# Patient Record
Sex: FEMALE | Race: WHITE | NOT HISPANIC OR LATINO | URBAN - METROPOLITAN AREA
[De-identification: names, ages, dates, MRNs, and addresses within clinical notes are randomized per-mention and may not be internally consistent; named-entity substitution may affect disease eponyms.]

---

## 2017-01-19 ENCOUNTER — COMMUNICATION - HEALTHEAST (OUTPATIENT)
Dept: ADMINISTRATIVE | Facility: CLINIC | Age: 22
End: 2017-01-19

## 2017-01-19 ENCOUNTER — HOSPITAL ENCOUNTER (OUTPATIENT)
Dept: ULTRASOUND IMAGING | Facility: CLINIC | Age: 22
Discharge: HOME | End: 2017-01-19
Attending: ADVANCED PRACTICE MIDWIFE

## 2017-01-19 DIAGNOSIS — N63.20 LEFT BREAST LUMP: ICD-10-CM

## 2017-01-23 ENCOUNTER — COMMUNICATION - HEALTHEAST (OUTPATIENT)
Dept: ADMINISTRATIVE | Facility: CLINIC | Age: 22
End: 2017-01-23

## 2017-01-23 ENCOUNTER — AMBULATORY - HEALTHEAST (OUTPATIENT)
Dept: MIDWIFE SERVICES | Facility: CLINIC | Age: 22
End: 2017-01-23

## 2017-01-23 DIAGNOSIS — Z79.3 DYSMENORRHEA TREATED WITH ORAL CONTRACEPTIVE: ICD-10-CM

## 2017-01-23 DIAGNOSIS — Z00.00 HEALTH CARE MAINTENANCE: ICD-10-CM

## 2017-01-23 DIAGNOSIS — N94.6 DYSMENORRHEA TREATED WITH ORAL CONTRACEPTIVE: ICD-10-CM

## 2017-01-25 ENCOUNTER — COMMUNICATION - HEALTHEAST (OUTPATIENT)
Dept: MIDWIFE SERVICES | Facility: CLINIC | Age: 22
End: 2017-01-25

## 2017-02-08 ENCOUNTER — COMMUNICATION - HEALTHEAST (OUTPATIENT)
Dept: MIDWIFE SERVICES | Facility: CLINIC | Age: 22
End: 2017-02-08

## 2017-04-20 ENCOUNTER — AMBULATORY - HEALTHEAST (OUTPATIENT)
Dept: MIDWIFE SERVICES | Facility: CLINIC | Age: 22
End: 2017-04-20

## 2017-07-17 ENCOUNTER — COMMUNICATION - HEALTHEAST (OUTPATIENT)
Dept: ADMINISTRATIVE | Facility: CLINIC | Age: 22
End: 2017-07-17

## 2017-07-17 DIAGNOSIS — Z00.00 HEALTH CARE MAINTENANCE: ICD-10-CM

## 2017-07-17 DIAGNOSIS — Z79.3 DYSMENORRHEA TREATED WITH ORAL CONTRACEPTIVE: ICD-10-CM

## 2017-07-17 DIAGNOSIS — N94.6 DYSMENORRHEA TREATED WITH ORAL CONTRACEPTIVE: ICD-10-CM

## 2018-02-27 ENCOUNTER — COMMUNICATION - HEALTHEAST (OUTPATIENT)
Dept: MIDWIFE SERVICES | Facility: CLINIC | Age: 23
End: 2018-02-27

## 2018-03-06 ENCOUNTER — COMMUNICATION - HEALTHEAST (OUTPATIENT)
Dept: SCHEDULING | Facility: CLINIC | Age: 23
End: 2018-03-06

## 2018-03-06 DIAGNOSIS — Z30.9 CONTRACEPTIVE MANAGEMENT: ICD-10-CM

## 2018-07-02 ENCOUNTER — COMMUNICATION - HEALTHEAST (OUTPATIENT)
Dept: ADMINISTRATIVE | Facility: CLINIC | Age: 23
End: 2018-07-02

## 2018-07-10 ENCOUNTER — OFFICE VISIT - HEALTHEAST (OUTPATIENT)
Dept: MIDWIFE SERVICES | Facility: CLINIC | Age: 23
End: 2018-07-10

## 2018-07-10 DIAGNOSIS — Z30.41 ENCOUNTER FOR SURVEILLANCE OF CONTRACEPTIVE PILLS: ICD-10-CM

## 2018-07-10 DIAGNOSIS — Z11.3 ROUTINE SCREENING FOR STI (SEXUALLY TRANSMITTED INFECTION): ICD-10-CM

## 2018-07-10 DIAGNOSIS — Z00.00 ANNUAL PHYSICAL EXAM: ICD-10-CM

## 2018-07-10 DIAGNOSIS — Z12.4 SCREENING FOR CERVICAL CANCER: ICD-10-CM

## 2018-07-10 LAB — HIV 1+2 AB+HIV1 P24 AG SERPL QL IA: NEGATIVE

## 2018-07-10 ASSESSMENT — MIFFLIN-ST. JEOR: SCORE: 1401.79

## 2018-07-11 LAB
BKR LAB AP ABNORMAL BLEEDING: NO
BKR LAB AP BIRTH CONTROL/HORMONES: NORMAL
BKR LAB AP CERVICAL APPEARANCE: NORMAL
BKR LAB AP GYN ADEQUACY: NORMAL
BKR LAB AP GYN INTERPRETATION: NORMAL
BKR LAB AP HPV REFLEX: NORMAL
BKR LAB AP LMP: NORMAL
BKR LAB AP PATIENT STATUS: NORMAL
BKR LAB AP PREVIOUS ABNORMAL: NO
BKR LAB AP PREVIOUS NORMAL: NORMAL
C TRACH DNA SPEC QL PROBE+SIG AMP: NEGATIVE
HBV SURFACE AG SERPL QL IA: NEGATIVE
HCV AB SERPL QL IA: NEGATIVE
HIGH RISK?: NO
N GONORRHOEA DNA SPEC QL NAA+PROBE: NEGATIVE
PATH REPORT.COMMENTS IMP SPEC: NORMAL
RESULT FLAG (HE HISTORICAL CONVERSION): NORMAL
T PALLIDUM AB SER QL: NEGATIVE

## 2019-01-26 ENCOUNTER — COMMUNICATION - HEALTHEAST (OUTPATIENT)
Dept: MIDWIFE SERVICES | Facility: CLINIC | Age: 24
End: 2019-01-26

## 2019-06-20 ENCOUNTER — COMMUNICATION - HEALTHEAST (OUTPATIENT)
Dept: ADMINISTRATIVE | Facility: CLINIC | Age: 24
End: 2019-06-20

## 2019-06-20 ENCOUNTER — COMMUNICATION - HEALTHEAST (OUTPATIENT)
Dept: MIDWIFE SERVICES | Facility: CLINIC | Age: 24
End: 2019-06-20

## 2019-06-20 DIAGNOSIS — Z30.41 ENCOUNTER FOR SURVEILLANCE OF CONTRACEPTIVE PILLS: ICD-10-CM

## 2020-07-09 ENCOUNTER — OFFICE VISIT - HEALTHEAST (OUTPATIENT)
Dept: MIDWIFE SERVICES | Facility: CLINIC | Age: 25
End: 2020-07-09

## 2020-07-09 ENCOUNTER — AMBULATORY - HEALTHEAST (OUTPATIENT)
Dept: LAB | Facility: CLINIC | Age: 25
End: 2020-07-09

## 2020-07-09 DIAGNOSIS — Z20.9 EXPOSURE TO POTENTIAL INFECTION: ICD-10-CM

## 2020-07-09 DIAGNOSIS — Z00.00 ROUTINE GENERAL MEDICAL EXAMINATION AT A HEALTH CARE FACILITY: ICD-10-CM

## 2020-07-09 DIAGNOSIS — Z11.3 ROUTINE SCREENING FOR STI (SEXUALLY TRANSMITTED INFECTION): ICD-10-CM

## 2020-07-09 LAB — COVID-19 ANTIBODY IGG: NEGATIVE

## 2020-07-09 ASSESSMENT — MIFFLIN-ST. JEOR: SCORE: 1375.71

## 2020-07-10 LAB
C TRACH DNA SPEC QL PROBE+SIG AMP: NEGATIVE
N GONORRHOEA DNA SPEC QL NAA+PROBE: NEGATIVE

## 2020-09-21 ENCOUNTER — AMBULATORY - HEALTHEAST (OUTPATIENT)
Dept: INTERNAL MEDICINE | Facility: CLINIC | Age: 25
End: 2020-09-21

## 2020-09-21 ENCOUNTER — VIRTUAL VISIT (OUTPATIENT)
Dept: FAMILY MEDICINE | Facility: OTHER | Age: 25
End: 2020-09-21
Payer: COMMERCIAL

## 2020-09-21 DIAGNOSIS — Z20.822 SUSPECTED 2019 NOVEL CORONAVIRUS INFECTION: ICD-10-CM

## 2020-09-21 PROCEDURE — 99421 OL DIG E/M SVC 5-10 MIN: CPT | Performed by: PHYSICIAN ASSISTANT

## 2020-09-21 NOTE — PROGRESS NOTES
"Date: 2020 09:06:13  Clinician: Michelle Faye  Clinician NPI: [id number]  Patient: Brittany Chadwick  Patient : 1995  Patient Address: [address] [place] [address]  Patient Phone: [phone]  Visit Protocol: URI  Patient Summary:  Brittany is a 24 year old ( : 1995 ) female who initiated a [facility] Visit for COVID-19 (Coronavirus) evaluation and screening. When asked the question \"Please sign me up to receive news, health information and promotions from [facility].\", Brittany responded \"Yes\".    Brittany states her symptoms started 1-2 days ago.   Her symptoms consist of chills, malaise, facial pain or pressure, a sore throat, tooth pain, ageusia, diarrhea, a cough, nasal congestion, a headache, anosmia, vomiting, rhinitis, nausea, and myalgia. She is experiencing mild difficulty breathing with activities but can speak normally in full sentences. Brittany also feels feverish but was unable to measure her temperature.   Symptom details     Nasal secretions: The color of her mucus is blood-tinged and clear.    Cough: Brittany coughs every 5-10 minutes and her cough is more bothersome at night. Phlegm does not come into her throat when she coughs. She does not believe her cough is caused by post-nasal drip.     Sore throat: Brittany reports having severe throat pain (7-9 on a 10 point pain scale), does not have exudate on her tonsils, and can swallow liquids. The lymph nodes in her neck are not enlarged. A rash has not appeared on the skin since the sore throat started.     Facial pain or pressure: The facial pain or pressure does not feel worse when bending or leaning forward.     Headache: She states the headache is moderate (4-6 on a 10 point pain scale).     Tooth pain: The tooth pain is not caused by a cavity, recent dental work, or other mouth problems.      Brittany denies having ear pain, wheezing, and enlarged lymph nodes. She also denies having a sinus infection within the past year, " taking antibiotic medication in the past month, and having recent facial or sinus surgery in the past 60 days.   Precipitating events  Within the past week, Brittany has not been exposed to someone with strep throat. She has not recently been exposed to someone with influenza. Brittany has been in close contact with the following high risk individuals: children under the age of 5.   Pertinent COVID-19 (Coronavirus) information  In the past 14 days, Brittany has not worked in a congregate living setting.   She does not work or volunteer as healthcare worker or a  and does not work or volunteer in a healthcare facility.   Brittany also has not lived in a congregate living setting in the past 14 days. She does not live with a healthcare worker.   Brittany has not had a close contact with a laboratory-confirmed COVID-19 patient within 14 days of symptom onset.   Since December 2019, Brittany and has not had upper respiratory infection or influenza-like illness. Has not been diagnosed with lab-confirmed COVID-19 test   Pertinent medical history  Brittany does not get yeast infections when she takes antibiotics.   Brittany needs a return to work/school note.   Weight: 135 lbs   Brittany does not smoke or use smokeless tobacco.   She denies pregnancy and denies breastfeeding. She has menstruated in the past month.   Weight: 135 lbs    MEDICATIONS: No current medications, ALLERGIES: NKDA  Clinician Response:  Dear Brittany,   Your symptoms show that you may have coronavirus (COVID-19). This illness can cause fever, cough and trouble breathing. Many people get a mild case and get better on their own. Some people can get very sick.  What should I do?  We would like to test you for this virus.   1. Please call [phone] to schedule your visit. Explain that [facility] were [facility] to have a COVID-19 test. Be ready to share your [facility] visit ID number.  The following will serve as your written order for this COVID Test,  "ordered by me, for the indication of suspected COVID [Z20.828]: The test will be ordered in [employer], our electronic health record, after you are scheduled. It will show as ordered and authorized by Jospeh Mejia MD.  Order: COVID-19 (Coronavirus) PCR for SYMPTOMATIC testing from [facility].      2. When it's time for your COVID test:  Stay at least 6 feet away from others. (If someone will drive you to your test, stay in the backseat, as far away from the  as you can.)   Cover your mouth and nose with a mask, tissue or washcloth.  Go straight to the testing site. Don't make any stops on the way there or back.      3.Starting now: Stay home and away from others (self-isolate) until:   You've had no fever---and no medicine that reduces fever---for one full day (24 hours). And...   Your other symptoms have gotten better. For example, your cough or breathing has improved. And...   At least 10 days have passed since your symptoms started.       During this time, don't leave the house except for testing or medical care.   Stay in your own room, even for meals. Use your own bathroom if you can.   Stay away from others in your home. No hugging, kissing or shaking hands. No visitors.  Don't go to work, school or anywhere else.    Clean \"high touch\" surfaces often (doorknobs, counters, handles, etc.). Use a household cleaning spray or wipes. You'll find a full list of  on the EPA website: www.epa.gov/pesticide-registration/list-n-disinfectants-use-against-sars-cov-2.   Cover your mouth and nose with a mask, tissue or washcloth to avoid spreading germs.  Wash your hands and face often. Use soap and water.  Caregivers in these groups are at risk for severe illness due to COVID-19:  o People 65 years and older  o People who live in a nursing home or long-term care facility  o People with chronic disease (lung, heart, cancer, diabetes, kidney, liver, immunologic)  o People who have a weakened immune system, including those " who:   Are in cancer treatment  Take medicine that weakens the immune system, such as corticosteroids  Had a bone marrow or organ transplant  Have an immune deficiency  Have poorly controlled HIV or AIDS  Are obese (body mass index of 40 or higher)  Smoke regularly   o Caregivers should wear gloves while washing dishes, handling laundry and cleaning bedrooms and bathrooms.  o Use caution when washing and drying laundry: Don't shake dirty laundry, and use the warmest water setting that you can.  o For more tips, go to www.cdc.gov/coronavirus/2019-ncov/downloads/10Things.pdf.    4.Sign up for [employer]. We know it's scary to hear that you might have COVID-19. We want to track your symptoms to make sure you're okay over the next 2 weeks. Please look for an email from [employer]---this is a free, online program that we'll use to keep in touch. To sign up, follow the link in the email. Learn more at [email or web address]  How can I take care of myself?   Get lots of rest. Drink extra fluids (unless a doctor has told you not to).   Take Tylenol (acetaminophen) for fever or pain. If you have liver or kidney problems, ask your family doctor if it's okay to take Tylenol.   Adults can take either:    650 mg (two 325 mg pills) every 4 to 6 hours, or...   1,000 mg (two 500 mg pills) every 8 hours as needed.    Note: Don't take more than 3,000 mg in one day. Acetaminophen is found in many medicines (both prescribed and over-the-counter medicines). Read all labels to be sure you don't take too much.   For children, check the Tylenol bottle for the right dose. The dose is based on the child's age or weight.    If you have other health problems (like cancer, heart failure, an organ transplant or severe kidney disease): Call your specialty clinic if you don't feel better in the next 2 days.       Know when to call 911. Emergency warning signs include:    Trouble breathing or shortness of breath Pain or pressure in  the chest that doesn't go away Feeling confused like you haven't felt before, or not being able to wake up Bluish-colored lips or face.  Where can I get more information?   [facility] -- About COVID-19: [email or web address]   CDC -- What to Do If You're Sick: www.cdc.gov/coronavirus/2019-ncov/about/steps-when-sick.html   CDC -- Ending Home Isolation: www.cdc.gov/coronavirus/2019-ncov/hcp/disposition-in-home-patients.html   [facility] -- Caring for Someone: www.cdc.gov/coronavirus/2019-ncov/if-you-are-sick/care-for-someone.html   [facility] -- Interim Guidance for Hospital Discharge to Home: www.health.[facility].mn./diseases/coronavirus/hcp/hospdischarge.pdf   [facility] clinical trials (COVID-19 research studies): clinicalaffairs.[facility]-clinical-trials    Below are the COVID-19 hotlines at the [facility] ([facility]). Interpreters are available.    For health questions: Call 991-105-8176 or [phone] (7 a.m. to 7 p.m.) For questions about schools and childcare: Call [phone] or [phone] (7 a.m. to 7 p.m.)    Diagnosis: Cough  Diagnosis ICD: R05

## 2021-05-29 NOTE — TELEPHONE ENCOUNTER
Telephone call to patient.  No answer, and voicemail box has not yet been set up.  Unable to leave a voicemail.  [employer] message sent to patient to inform her that her refill has been sent to the [facility] in [place].

## 2021-05-29 NOTE — TELEPHONE ENCOUNTER
Name of caller: Patient  Phone number where you may be reached: [phone]  Reason for call: pt is calling for a refill of the Apri - she thought she would get a years worth but did not. [facility] in [place] is her preferred pharmacy.  Best time to call back: any  If we don't reach you, is it okay to leave a detailed message? no

## 2021-06-01 VITALS — WEIGHT: 147 LBS | HEIGHT: 64 IN | BODY MASS INDEX: 25.1 KG/M2

## 2021-06-04 VITALS
BODY MASS INDEX: 23.24 KG/M2 | WEIGHT: 139.5 LBS | HEART RATE: 68 BPM | SYSTOLIC BLOOD PRESSURE: 100 MMHG | DIASTOLIC BLOOD PRESSURE: 66 MMHG | HEIGHT: 65 IN

## 2021-06-09 NOTE — PROGRESS NOTES
Assessment:      Healthy female exam.   Consulting for contraception management  IUD consult     Plan:      1. Discussed nutrition and exercise, life/work balance, nutrition and hydration.  Advised MVI  2. Blood tests: not indicated today  3. STI screening accepts  4. Breast self exam technique reviewed and recommendation to know breasts well and when to report concerns to CNM.  Mammogram at tgd28-22.  5. Contraception: condoms, considering IUD  6.  Next pap due 7/2021. Pap only unless ASCUS  7.  RTC 1 year for annual physical exam, PRN  8.  COVID antibody test    Subjective:      Brittany Chadwick is a 24 y.o. female who presents for an annual exam.  Stopped taking Apri.  Felt that she was bloated and had gained weight especially in her breasts.  Stopped taking it about a year ago and instead has been using condoms.  interested in changing to contraception with fewer hormonal side effects.  We reviewed the different types of IUDs and the risks and benefits including, but not limited to: Bleeding pattern changes, perforation, expulsion, infection, side effects, timing for placement, medications for softening the cervix and anxiety prior to placement.  Also recommended that she take ibuprofen prior to placement if she chooses to have 1 in.  She is currently not working, as she was working at a  COVID.  She graduated in December and is now looking for a job.  She is questioning whether we can do COVID antibody testing for her today.  States that she was very sick in December without the symptoms and did not have strep or influenza at the time.  Discussed that it would be very unlikely that she had COVID-19 at that time, but we do have testing available.  Also discussed limitations of not knowing how long IgG antibodies remained in the system with COVID-19, as we are learning more more about this disease each day.  She does have one male partner and that relationship is going well.  Diet: Eats pretty healthy per  her report.  Her mom and her joined weight watchers in March and that is going well.  Exercise: Works out daily by running, hiking, rollerblading, doing videos, walking    Healthy Habits:   Regular Exercise: Yes   Sunscreen Use: Yes  Healthy Diet: Yes  Dental Visits Regularly: Yes  Seat Belt: Yes  Sexually active: Yes  STI risk No  domestic violence No    Self Breast Exam Monthly:No  Colonoscopy: N/A  Lipid Profile: N/A  Glucose Screen: N/A  Prevention of Osteoporosis: N/A  Last Dexa: N/A      Immunization History   Administered Date(s) Administered     DTaP / HiB 1998     Dtap 1997, 1997, 1997, 2002     HPV Quadrivalent 2009, 2009, 10/26/2011, 2015     Hep B, Peds or Adolescent 1997, 1997     Hib (PRP-T) 1997, 1997, 1997     Influenza, seasonal,quad inj 6-35 mos 10/25/2012     Influenza,live, Nasal Laiv4 2014     Influenza,seasonal quad, PF, =/> 6months 2017     Influenza,seasonal, Inj IIV3 2007     MMR 1998, 2002     Meningococcal MCV4P 10/26/2011, 2015     POLIO, Unspecified 1997, 1997, 1998, 2001     Tdap 2009, 2015     Varicella 1998, 2009     Immunization status: up to date and documented.    Gynecologic History  Patient's last menstrual period was 2020.  Contraception: condoms    Cancer screening:   Last Pap: 2018. Results were: normal  Last mammogram: n/a.       OB History    Para Term  AB Living   0 0 0 0 0 0   SAB TAB Ectopic Multiple Live Births   0 0 0 0 0       Current Outpatient Medications   Medication Sig Dispense Refill     b complex vitamins tablet Take 1 tablet by mouth daily.       desogestrel-ethinyl estradiol (APRI) 0.15-0.03 mg per tablet Take 1 tablet by mouth daily. 84 tablet 1     No current facility-administered medications for this visit.      Past Medical History:   Diagnosis Date     Dysmenorrhea      managed with OCP use     Past Surgical History:   Procedure Laterality Date     NO PAST SURGERIES       Patient has no known allergies.  Family History   Problem Relation Age of Onset     Bipolar disorder Paternal Grandmother      Mental illness Paternal Grandmother      No Medical Problems Mother      Diverticulitis Father      No Medical Problems Sister      No Medical Problems Maternal Uncle      No Medical Problems Paternal Aunt      No Medical Problems Paternal Uncle      No Medical Problems Maternal Grandmother      No Medical Problems Maternal Grandfather      No Medical Problems Maternal Uncle      No Medical Problems Paternal Uncle      No Medical Problems Paternal Uncle      No Medical Problems Paternal Uncle      No Medical Problems Paternal Uncle      Social History     Socioeconomic History     Marital status: Single     Spouse name: N/A     Number of children: 0     Years of education: high school     Highest education level: Not on file   Occupational History     Occupation: student     Comment:  [employer] fall 2015.  Plans to major in art education   Social Needs     Financial resource strain: Not on file     Food insecurity     Worry: Not on file     Inability: Not on file     Transportation needs     Medical: Not on file     Non-medical: Not on file   Tobacco Use     Smoking status: Never Smoker     Smokeless tobacco: Never Used   Substance and Sexual Activity     Alcohol use: Yes     Comment: one per week     Drug use: No     Sexual activity: Yes     Partners: Male     Birth control/protection: Condom, OCP   Lifestyle     Physical activity     Days per week: Not on file     Minutes per session: Not on file     Stress: Not on file   Relationships     Social connections     Talks on phone: Not on file     Gets together: Not on file     Attends Temple service: Not on file     Active member of club or organization: Not on file     Attends meetings of clubs or organizations: Not on file      "Relationship status: Not on file     Intimate partner violence     Fear of current or ex partner: Not on file     Emotionally abused: Not on file     Physically abused: Not on file     Forced sexual activity: Not on file   Other Topics Concern     Not on file   Social History Narrative    Lives with parents now.  In fall 2015 will live at [facility] with a roommate, returning summers to live at home with parents.       Review of Systems  General:  Wt gain prior to stopping Apri  Eyes: Denies problem  Ears/Nose/Throat: Denies problem  Cardiovascular: Denies problem  Respiratory:  Denies problem  Gastrointestinal:  bloating prior to stopping Apri  Genitourinary: denies problems  Musculoskeletal:  Denies problem  Skin: Denies problem  Neurologic:denies problems  Psychiatric: denies problems  Endocrine: denies problems        Objective:         Vitals:    07/09/20 0851   BP: 100/66   Pulse: 68   Weight: 139 lb 8 oz (63.3 kg)   Height: 5' 4.5\" (1.638 m)       Physical Exam:  General Appearance: Alert, cooperative, no distress, appears stated age  Skin: Skin color, texture, turgor normal, no rashes or lesions  Throat: Lips, mucosa, and tongue normal; teeth and gums normal  HEENT: grossly normal; otoscopic and opthalmic exam not performed.   Neck: Supple, symmetrical, trachea midline, no adenopathy;  thyroid: not enlarged, symmetric, no tenderness/mass/nodules  Lungs: Clear to auscultation bilaterally, respirations unlabored  Breasts: No breast masses, tenderness, asymmetry, or nipple discharge.  Heart: Regular rate and rhythm, S1 and S2 normal, no murmur, rub, or gallop  Abdomen: Soft, non-tender. No organomegaly or masses  Pelvic: Declines exam today      "

## 2021-06-09 NOTE — PATIENT INSTRUCTIONS - HE
Please observe for the following warning signs of rare complications to taking a combined-hormonal contraceptive pill; the warning signs spell out the word ACHES. If you have any of these symptoms, it may or may not be related to pill use. Usually these warning signs have an explanation other than pills, but you need to get checked to be sure. Do not ignore these problems or wait to see if they disappear on their own.     Abdominal Pain    Blood clot in the pelvis or liver    Benign liver tumor or gallbladder disease    Pregnancy in the fallopian tubes    Chest Pain    Blood clot in the lungs    Heart attack    Heart pain    Breast lump    Headaches    Stroke    Migraine headache, blurred vision, spots, zigzag lines, weakness, difficulty speaking    High blood pressure    Eye Problems    Stroke    Blurred vision, double vision, or loss of vision    Migraine headache with blurred vision, spots, zigzag lines    Blood clots in the eyes    Change in shape of cornea (contacts don't fit)    Severe Leg Pain    Inflammation and blood clots of a vein in the leg

## 2021-06-10 NOTE — PROGRESS NOTES
Received request for refill of OCPs (Apri).   Noted that pt's original RX was for one year of Apri OCPs given at time of annual exam 12/21/16, sent to pharmacy in [place], but in February, a request was received for one refill at a pharmacy in [place], [place], as pt had returned to school and desired refill at that pharmacy.  This most recent refill request is from that pharmacy in [place].  Attempted to call pt to inquire about where she plans to fill her prescriptions in the future, but no answer and unable to leave message.  Filled request at [facility] in [place] to allow pt to complete school year there.  In future will need to clarify where she would like to receive prescriptions.

## 2021-06-19 NOTE — PROGRESS NOTES
Assessment:      22 y.o., Healthy female exam  Contraceptive management  Routine Screening for cervical cancer  Routine Screening for STIs  Vegetarian, eats eggs and fish.     Plan:      1. Discussed nutrition and exercise. Advised MVI, Vitamin D3 2000-4000IU geltab and an omega 3 supplement daily.  2. Lab tests: accepts screening for STIs, and first PAP today.   3. Recommended annual breast exam by provider.  4. Contraception: Apri. Refilled x 1 year. Reviewed how to take and use of back up method x 1 week before it is effective. Plans to start Sunday after next menses rather than quick start method. Condoms always for STI prevention.  5.  Next pap due in 3 years if normal today.  6.  RTC 1 year for annual physical exam, PRN    Subjective:      Brittany Chadwick is a 22 y.o. female who presents for an annual exam and refill of OCPs. Has been on COCs (Apri) since 8th grade. She started these due to dysmenorrhea. Reports satisfaction with this method and would like to continue. Denies hx of migraine with aura, blood clots, smoking. Reports her withdrawal bleeding came 1 week early with last 2 cycles, however she wasn't taking the pills as regularly as far as timing and then ran out last month. She normally is good at taking the pill regularly and has never been pregnant. Has been using condoms reliably since off of BC.    Reports being mostly vegetarian, however eats fish and eggs. Avoids other meat and dairy products. Takes Vitamin B12, unsure of dose.    Healthy Habits:   Regular Exercise: Yes   Sunscreen Use: Yes  Healthy Diet: Yes  Dental Visits Regularly: Yes  Seat Belt: Yes  Sexually active: Yes  STI risk in a monagamous relatioship x 1 year, however at risk due to age  Domestic violence No    Gynecologic History  Patient's last menstrual period was 06/09/2018., approximately  Contraception: none, currently off of BC due to running out. Would like refill for Apri as she likes this method..      OB History     Para Term  AB Living   0 0 0 0 0 0   SAB TAB Ectopic Multiple Live Births   0 0 0 0 0             Current Outpatient Prescriptions   Medication Sig Dispense Refill     b complex vitamins tablet Take 1 tablet by mouth daily.       desogestrel-ethinyl estradiol (APRI) 0.15-0.03 mg per tablet Take 1 tablet by mouth daily. 84 tablet 3     No current facility-administered medications for this visit.      Past Medical History:   Diagnosis Date     Dysmenorrhea     managed with OCP use     Past Surgical History:   Procedure Laterality Date     NO PAST SURGERIES       Review of patient's allergies indicates no known allergies.  Family History   Problem Relation Age of Onset     Bipolar disorder Paternal Grandmother      Mental illness Paternal Grandmother      No Medical Problems Mother      Diverticulitis Father      No Medical Problems Sister      No Medical Problems Maternal Uncle      No Medical Problems Paternal Aunt      No Medical Problems Paternal Uncle      No Medical Problems Maternal Grandmother      No Medical Problems Maternal Grandfather      No Medical Problems Maternal Uncle      No Medical Problems Paternal Uncle      No Medical Problems Paternal Uncle      No Medical Problems Paternal Uncle      No Medical Problems Paternal Uncle      Social History     Social History     Marital status: Single     Spouse name: N/A     Number of children: 0     Years of education: high school     Occupational History     student      [employer] 2015.  Plans to major in art education     Social History Main Topics     Smoking status: Never Smoker     Smokeless tobacco: Never Used     Alcohol use Yes      Comment: one per week     Drug use: No     Sexual activity: Yes     Partners: Male     Birth control/ protection: Condom, OCP     Other Topics Concern     Not on file     Social History Narrative    Lives with parents now.  In 2015 will live at [facility] with a roommate, returning summers to  "live at home with parents.       Review of Systems  General:  Denies.  Eyes/Ears/Nose/Throat: Denies problem  Cardiovascular: Denies problem  Respiratory:  Denies problem  Gastrointestinal:  denies problems  Genitourinary: denies problems  Musculoskeletal: Denies problem  Skin: Denies problem  Neurologic:denies problems  Psychiatric: denies problems  Endocrine: denies problems    Immunization History   Administered Date(s) Administered     HPV Quadrivalent 08/07/2015     Influenza, seasonal,quad inj 6-35 mos 10/25/2012     Meningococcal MCV4P 08/07/2015     Tdap 08/07/2015     Immunization status: up to date and documented.    Objective:         Vitals:    07/10/18 1224   BP: 100/66   Pulse: 82   Resp: 14   Weight: 147 lb (66.7 kg)   Height: 5' 4\" (1.626 m)       Physical Exam:  General Appearance: alert, cooperative, no distress, appears stated age  Skin: skin color, texture, turgor normal, no rashes or lesions  Throat: lips, mucosa, and tongue normal; teeth and gums normal  HEENT: grossly normal; otoscopic and opthalmic exam not performed.   Neck: supple, symmetrical, trachea midline, no adenopathy; thyroid: not enlarged, symmetric, no tenderness/mass/nodules  Lungs: clear to auscultation bilaterally, respirations unlabored  Breasts: no breast masses, tenderness, asymmetry, or nipple discharge.  Heart: regular rate and rhythm, S1 and S2 normal, no murmur, rub, or gallop  Abdomen: soft, non-tender. No organomegaly or masses  Pelvic: external genitalia normal without lesions or irritation. Vagina and cervix show no lesions, inflammation, discharge or tenderness. No cystocele, No rectocele. Uterus & adnexal normal without masses or tenderness.     MARGARITA Buchanan,CNM        "

## 2021-06-20 ENCOUNTER — HEALTH MAINTENANCE LETTER (OUTPATIENT)
Age: 26
End: 2021-06-20

## 2021-08-03 PROBLEM — A41.9 SEPSIS (H): Status: RESOLVED | Noted: 2017-02-11 | Resolved: 2018-07-10

## 2021-08-15 ENCOUNTER — HEALTH MAINTENANCE LETTER (OUTPATIENT)
Age: 26
End: 2021-08-15

## 2021-10-11 ENCOUNTER — HEALTH MAINTENANCE LETTER (OUTPATIENT)
Age: 26
End: 2021-10-11

## 2022-09-24 ENCOUNTER — HEALTH MAINTENANCE LETTER (OUTPATIENT)
Age: 27
End: 2022-09-24

## 2023-10-14 ENCOUNTER — HEALTH MAINTENANCE LETTER (OUTPATIENT)
Age: 28
End: 2023-10-14